# Patient Record
Sex: FEMALE | Race: WHITE | NOT HISPANIC OR LATINO | Employment: FULL TIME | URBAN - METROPOLITAN AREA
[De-identification: names, ages, dates, MRNs, and addresses within clinical notes are randomized per-mention and may not be internally consistent; named-entity substitution may affect disease eponyms.]

---

## 2023-02-24 LAB
ALANINE AMINOTRANSFERASE (SGPT) (U/L) IN SER/PLAS: 13 U/L (ref 7–45)
ALBUMIN (G/DL) IN SER/PLAS: 3.8 G/DL (ref 3.4–5)
ALKALINE PHOSPHATASE (U/L) IN SER/PLAS: 67 U/L (ref 33–110)
ANION GAP IN SER/PLAS: 14 MMOL/L (ref 10–20)
ASPARTATE AMINOTRANSFERASE (SGOT) (U/L) IN SER/PLAS: 10 U/L (ref 9–39)
BILIRUBIN TOTAL (MG/DL) IN SER/PLAS: 0.4 MG/DL (ref 0–1.2)
CALCIUM (MG/DL) IN SER/PLAS: 8.8 MG/DL (ref 8.6–10.3)
CARBON DIOXIDE, TOTAL (MMOL/L) IN SER/PLAS: 25 MMOL/L (ref 21–32)
CHLORIDE (MMOL/L) IN SER/PLAS: 105 MMOL/L (ref 98–107)
CHOLESTEROL (MG/DL) IN SER/PLAS: 208 MG/DL (ref 0–199)
CHOLESTEROL IN HDL (MG/DL) IN SER/PLAS: 71.8 MG/DL
CHOLESTEROL/HDL RATIO: 2.9
CREATININE (MG/DL) IN SER/PLAS: 0.79 MG/DL (ref 0.5–1.05)
GFR FEMALE: >90 ML/MIN/1.73M2
GLUCOSE (MG/DL) IN SER/PLAS: 101 MG/DL (ref 74–99)
LDL: 104 MG/DL (ref 0–99)
POTASSIUM (MMOL/L) IN SER/PLAS: 3.9 MMOL/L (ref 3.5–5.3)
PROTEIN TOTAL: 6.7 G/DL (ref 6.4–8.2)
SODIUM (MMOL/L) IN SER/PLAS: 140 MMOL/L (ref 136–145)
TRIGLYCERIDE (MG/DL) IN SER/PLAS: 161 MG/DL (ref 0–149)
UREA NITROGEN (MG/DL) IN SER/PLAS: 13 MG/DL (ref 6–23)
VLDL: 32 MG/DL (ref 0–40)

## 2023-04-27 DIAGNOSIS — I10 HYPERTENSION, UNSPECIFIED TYPE: ICD-10-CM

## 2023-04-27 RX ORDER — METOPROLOL SUCCINATE 100 MG/1
100 TABLET, EXTENDED RELEASE ORAL DAILY
COMMUNITY
End: 2023-04-27 | Stop reason: SDUPTHER

## 2023-04-27 RX ORDER — METOPROLOL SUCCINATE 100 MG/1
100 TABLET, EXTENDED RELEASE ORAL DAILY
Qty: 30 TABLET | Refills: 0 | Status: SHIPPED | OUTPATIENT
Start: 2023-04-27 | End: 2023-05-31 | Stop reason: SDUPTHER

## 2023-05-31 ENCOUNTER — OFFICE VISIT (OUTPATIENT)
Dept: PRIMARY CARE | Facility: CLINIC | Age: 43
End: 2023-05-31
Payer: COMMERCIAL

## 2023-05-31 ENCOUNTER — LAB (OUTPATIENT)
Dept: LAB | Facility: LAB | Age: 43
End: 2023-05-31
Payer: COMMERCIAL

## 2023-05-31 VITALS
HEART RATE: 62 BPM | DIASTOLIC BLOOD PRESSURE: 80 MMHG | HEIGHT: 67 IN | SYSTOLIC BLOOD PRESSURE: 128 MMHG | WEIGHT: 286.8 LBS | BODY MASS INDEX: 45.01 KG/M2

## 2023-05-31 DIAGNOSIS — E28.2 POLYCYSTIC OVARIAN SYNDROME: ICD-10-CM

## 2023-05-31 DIAGNOSIS — Z12.31 ENCOUNTER FOR SCREENING MAMMOGRAM FOR MALIGNANT NEOPLASM OF BREAST: ICD-10-CM

## 2023-05-31 DIAGNOSIS — I10 HYPERTENSION, UNSPECIFIED TYPE: ICD-10-CM

## 2023-05-31 DIAGNOSIS — E78.5 DYSLIPIDEMIA: ICD-10-CM

## 2023-05-31 DIAGNOSIS — I10 PRIMARY HYPERTENSION: ICD-10-CM

## 2023-05-31 DIAGNOSIS — E88.810 METABOLIC SYNDROME: ICD-10-CM

## 2023-05-31 DIAGNOSIS — Z00.00 HEALTHCARE MAINTENANCE: ICD-10-CM

## 2023-05-31 DIAGNOSIS — Z00.00 HEALTHCARE MAINTENANCE: Primary | ICD-10-CM

## 2023-05-31 PROBLEM — G89.29 CHRONIC LEFT-SIDED LOW BACK PAIN WITH LEFT-SIDED SCIATICA: Status: ACTIVE | Noted: 2023-05-31

## 2023-05-31 PROBLEM — D72.9 NEUTROPHILIC LEUKOCYTOSIS: Status: ACTIVE | Noted: 2023-05-31

## 2023-05-31 PROBLEM — E66.9 OBESITY: Status: ACTIVE | Noted: 2023-05-31

## 2023-05-31 PROBLEM — D72.828 NEUTROPHILIC LEUKOCYTOSIS: Status: ACTIVE | Noted: 2023-05-31

## 2023-05-31 PROBLEM — M54.42 CHRONIC LEFT-SIDED LOW BACK PAIN WITH LEFT-SIDED SCIATICA: Status: ACTIVE | Noted: 2023-05-31

## 2023-05-31 LAB
ALANINE AMINOTRANSFERASE (SGPT) (U/L) IN SER/PLAS: 11 U/L (ref 7–45)
ALBUMIN (G/DL) IN SER/PLAS: 4.1 G/DL (ref 3.4–5)
ALKALINE PHOSPHATASE (U/L) IN SER/PLAS: 78 U/L (ref 33–110)
ANION GAP IN SER/PLAS: 15 MMOL/L (ref 10–20)
ASPARTATE AMINOTRANSFERASE (SGOT) (U/L) IN SER/PLAS: 12 U/L (ref 9–39)
BASOPHILS (10*3/UL) IN BLOOD BY AUTOMATED COUNT: 0.05 X10E9/L (ref 0–0.1)
BASOPHILS/100 LEUKOCYTES IN BLOOD BY AUTOMATED COUNT: 0.3 % (ref 0–2)
BILIRUBIN TOTAL (MG/DL) IN SER/PLAS: 0.3 MG/DL (ref 0–1.2)
CALCIUM (MG/DL) IN SER/PLAS: 9.5 MG/DL (ref 8.6–10.3)
CARBON DIOXIDE, TOTAL (MMOL/L) IN SER/PLAS: 25 MMOL/L (ref 21–32)
CHLORIDE (MMOL/L) IN SER/PLAS: 103 MMOL/L (ref 98–107)
CHOLESTEROL (MG/DL) IN SER/PLAS: 179 MG/DL (ref 0–199)
CHOLESTEROL IN HDL (MG/DL) IN SER/PLAS: 89.1 MG/DL
CHOLESTEROL/HDL RATIO: 2
CREATININE (MG/DL) IN SER/PLAS: 0.75 MG/DL (ref 0.5–1.05)
EOSINOPHILS (10*3/UL) IN BLOOD BY AUTOMATED COUNT: 0.19 X10E9/L (ref 0–0.7)
EOSINOPHILS/100 LEUKOCYTES IN BLOOD BY AUTOMATED COUNT: 1.3 % (ref 0–6)
ERYTHROCYTE DISTRIBUTION WIDTH (RATIO) BY AUTOMATED COUNT: 12.4 % (ref 11.5–14.5)
ERYTHROCYTE MEAN CORPUSCULAR HEMOGLOBIN CONCENTRATION (G/DL) BY AUTOMATED: 32.3 G/DL (ref 32–36)
ERYTHROCYTE MEAN CORPUSCULAR VOLUME (FL) BY AUTOMATED COUNT: 91 FL (ref 80–100)
ERYTHROCYTES (10*6/UL) IN BLOOD BY AUTOMATED COUNT: 4.85 X10E12/L (ref 4–5.2)
ESTIMATED AVERAGE GLUCOSE FOR HBA1C: 126 MG/DL
GFR FEMALE: >90 ML/MIN/1.73M2
GLUCOSE (MG/DL) IN SER/PLAS: 94 MG/DL (ref 74–99)
HEMATOCRIT (%) IN BLOOD BY AUTOMATED COUNT: 44.3 % (ref 36–46)
HEMOGLOBIN (G/DL) IN BLOOD: 14.3 G/DL (ref 12–16)
HEMOGLOBIN A1C/HEMOGLOBIN TOTAL IN BLOOD: 6 %
IMMATURE GRANULOCYTES/100 LEUKOCYTES IN BLOOD BY AUTOMATED COUNT: 0.5 % (ref 0–0.9)
LDL: 63 MG/DL (ref 0–99)
LEUKOCYTES (10*3/UL) IN BLOOD BY AUTOMATED COUNT: 14.8 X10E9/L (ref 4.4–11.3)
LYMPHOCYTES (10*3/UL) IN BLOOD BY AUTOMATED COUNT: 2.41 X10E9/L (ref 1.2–4.8)
LYMPHOCYTES/100 LEUKOCYTES IN BLOOD BY AUTOMATED COUNT: 16.3 % (ref 13–44)
MONOCYTES (10*3/UL) IN BLOOD BY AUTOMATED COUNT: 0.74 X10E9/L (ref 0.1–1)
MONOCYTES/100 LEUKOCYTES IN BLOOD BY AUTOMATED COUNT: 5 % (ref 2–10)
NEUTROPHILS (10*3/UL) IN BLOOD BY AUTOMATED COUNT: 11.32 X10E9/L (ref 1.2–7.7)
NEUTROPHILS/100 LEUKOCYTES IN BLOOD BY AUTOMATED COUNT: 76.6 % (ref 40–80)
PLATELETS (10*3/UL) IN BLOOD AUTOMATED COUNT: 370 X10E9/L (ref 150–450)
POC APPEARANCE, URINE: CLEAR
POC BILIRUBIN, URINE: NEGATIVE
POC BLOOD, URINE: NEGATIVE
POC COLOR, URINE: NORMAL
POC GLUCOSE, URINE: NEGATIVE MG/DL
POC KETONES, URINE: NEGATIVE MG/DL
POC LEUKOCYTES, URINE: NEGATIVE
POC NITRITE,URINE: NEGATIVE
POC PH, URINE: 6 PH
POC PROTEIN, URINE: NEGATIVE MG/DL
POC SPECIFIC GRAVITY, URINE: 1.02
POC UROBILINOGEN, URINE: 0.2 EU/DL
POTASSIUM (MMOL/L) IN SER/PLAS: 4.3 MMOL/L (ref 3.5–5.3)
PROTEIN TOTAL: 7.5 G/DL (ref 6.4–8.2)
SODIUM (MMOL/L) IN SER/PLAS: 139 MMOL/L (ref 136–145)
THYROTROPIN (MIU/L) IN SER/PLAS BY DETECTION LIMIT <= 0.05 MIU/L: 1.64 MIU/L (ref 0.44–3.98)
TRIGLYCERIDE (MG/DL) IN SER/PLAS: 136 MG/DL (ref 0–149)
UREA NITROGEN (MG/DL) IN SER/PLAS: 12 MG/DL (ref 6–23)
VLDL: 27 MG/DL (ref 0–40)

## 2023-05-31 PROCEDURE — 80061 LIPID PANEL: CPT

## 2023-05-31 PROCEDURE — 99213 OFFICE O/P EST LOW 20 MIN: CPT | Performed by: STUDENT IN AN ORGANIZED HEALTH CARE EDUCATION/TRAINING PROGRAM

## 2023-05-31 PROCEDURE — 3074F SYST BP LT 130 MM HG: CPT | Performed by: STUDENT IN AN ORGANIZED HEALTH CARE EDUCATION/TRAINING PROGRAM

## 2023-05-31 PROCEDURE — 99396 PREV VISIT EST AGE 40-64: CPT | Performed by: STUDENT IN AN ORGANIZED HEALTH CARE EDUCATION/TRAINING PROGRAM

## 2023-05-31 PROCEDURE — 80053 COMPREHEN METABOLIC PANEL: CPT

## 2023-05-31 PROCEDURE — 84443 ASSAY THYROID STIM HORMONE: CPT

## 2023-05-31 PROCEDURE — 93000 ELECTROCARDIOGRAM COMPLETE: CPT | Performed by: STUDENT IN AN ORGANIZED HEALTH CARE EDUCATION/TRAINING PROGRAM

## 2023-05-31 PROCEDURE — 83036 HEMOGLOBIN GLYCOSYLATED A1C: CPT

## 2023-05-31 PROCEDURE — 3079F DIAST BP 80-89 MM HG: CPT | Performed by: STUDENT IN AN ORGANIZED HEALTH CARE EDUCATION/TRAINING PROGRAM

## 2023-05-31 PROCEDURE — 85025 COMPLETE CBC W/AUTO DIFF WBC: CPT

## 2023-05-31 PROCEDURE — 36415 COLL VENOUS BLD VENIPUNCTURE: CPT

## 2023-05-31 PROCEDURE — 1036F TOBACCO NON-USER: CPT | Performed by: STUDENT IN AN ORGANIZED HEALTH CARE EDUCATION/TRAINING PROGRAM

## 2023-05-31 PROCEDURE — 81002 URINALYSIS NONAUTO W/O SCOPE: CPT | Performed by: STUDENT IN AN ORGANIZED HEALTH CARE EDUCATION/TRAINING PROGRAM

## 2023-05-31 RX ORDER — ROSUVASTATIN CALCIUM 20 MG/1
20 TABLET, COATED ORAL DAILY
Qty: 90 TABLET | Refills: 1 | Status: SHIPPED | OUTPATIENT
Start: 2023-05-31 | End: 2024-01-17 | Stop reason: SDUPTHER

## 2023-05-31 RX ORDER — NORGESTIMATE AND ETHINYL ESTRADIOL 0.25-0.035
1 KIT ORAL DAILY
COMMUNITY
End: 2023-12-08 | Stop reason: SDUPTHER

## 2023-05-31 RX ORDER — OLMESARTAN MEDOXOMIL 20 MG/1
20 TABLET ORAL DAILY
Qty: 90 TABLET | Refills: 1 | Status: SHIPPED | OUTPATIENT
Start: 2023-05-31 | End: 2024-01-17 | Stop reason: SDUPTHER

## 2023-05-31 RX ORDER — SPIRONOLACTONE 100 MG/1
100 TABLET, FILM COATED ORAL 2 TIMES DAILY
COMMUNITY
End: 2023-12-08 | Stop reason: SDUPTHER

## 2023-05-31 RX ORDER — METOPROLOL SUCCINATE 100 MG/1
100 TABLET, EXTENDED RELEASE ORAL DAILY
Qty: 90 TABLET | Refills: 1 | Status: SHIPPED | OUTPATIENT
Start: 2023-05-31 | End: 2024-01-17 | Stop reason: SDUPTHER

## 2023-05-31 RX ORDER — METFORMIN HYDROCHLORIDE 500 MG/1
1000 TABLET, EXTENDED RELEASE ORAL 2 TIMES DAILY
COMMUNITY
End: 2023-12-08 | Stop reason: SDUPTHER

## 2023-05-31 RX ORDER — OLMESARTAN MEDOXOMIL 20 MG/1
20 TABLET ORAL DAILY
COMMUNITY
End: 2023-05-31 | Stop reason: SDUPTHER

## 2023-05-31 RX ORDER — ROSUVASTATIN CALCIUM 20 MG/1
20 TABLET, COATED ORAL DAILY
COMMUNITY
End: 2023-05-31 | Stop reason: SDUPTHER

## 2023-05-31 NOTE — RESULT ENCOUNTER NOTE
Hemoglobin A1c shows stability and some slight improvement at 6.0%    Complete blood cell count continues to show slightly elevated white blood cell count at 14.8 but otherwise within normal limits with a continued slightly elevated neutrophil count    I know the patient did see hematology in the past, but it might be wise to discuss having her follow-up once again within the next few years    Total cholesterol noted much improvement to 179, HDL 89, LDL 63, triglycerides 136    Sugar, kidneys, liver, electrolytes are all within normal limits    Thyroid within normal limits

## 2023-05-31 NOTE — PROGRESS NOTES
Subjective   Patient ID: Diamond Mitchell is a 43 y.o. female who presents for Annual Exam.  Today she is accompanied by alone.     HPI  1. Health Maintenance   Immunization: UTD  OB/GYN: last pap smear 2015, mammogram 5/2022, willing to update  Colon Cancer Screening: no family history  Diet/Exercise: Attempts balanced diet and exercise  Tobacco: denies use  EtOH: rarely     2. Hypertension  Taking metoprolol 100 mg daily and olmesartan 20 mg daily as prescribed   Tolerating well without side effects   Denies cardiopulmonary symptoms   Requesting refill today     3. Hyperlipidemia:   Lipid panel 208 71 104 161 as of 2/24/2023  Currently on rosuvastatin 20 mg as prescribed.   Requesting refill.    4. PCOS  Continues to follow up with Endocrinologist  Denies any concerns or complaints.    Current Outpatient Medications on File Prior to Visit   Medication Sig Dispense Refill    metFORMIN XR (Glucophage-XR) 500 mg 24 hr tablet Take 2 tablets (1,000 mg) by mouth 2 times a day.      metoprolol succinate XL (Toprol-XL) 100 mg 24 hr tablet Take 1 tablet (100 mg) by mouth once daily. 30 tablet 0    olmesartan (BENIcar) 20 mg tablet Take 1 tablet (20 mg) by mouth once daily.      rosuvastatin (Crestor) 20 mg tablet Take 1 tablet (20 mg) by mouth once daily.      spironolactone (Aldactone) 100 mg tablet Take 1 tablet (100 mg) by mouth 2 times a day.      Sprintec, 28, 0.25-35 mg-mcg tablet Take 1 tablet by mouth once daily.       No current facility-administered medications on file prior to visit.        Allergies   Allergen Reactions    Lisinopril Other       Immunization History   Administered Date(s) Administered    Moderna SARS-CoV-2 Vaccination 01/09/2021, 02/03/2021    Tdap 05/03/2021         Review of Systems  All pertinent positive symptoms are included in the history of present illness.  All other systems have been reviewed and are negative and noncontributory to this patient's current ailments.     Objective   BP  "128/80 (BP Location: Left arm, Patient Position: Sitting, BP Cuff Size: Large adult)   Pulse 62   Ht 1.689 m (5' 6.5\")   Wt 130 kg (286 lb 12.8 oz)   BMI 45.60 kg/m²   BSA: 2.47 meters squared  No visits with results within 1 Month(s) from this visit.   Latest known visit with results is:   Orders Only on 02/24/2023   Component Date Value Ref Range Status    Cholesterol 02/24/2023 208 (H)  0 - 199 mg/dL Final    Comment: .      AGE      DESIRABLE   BORDERLINE HIGH   HIGH     0-19 Y     0 - 169       170 - 199     >/= 200    20-24 Y     0 - 189       190 - 224     >/= 225         >24 Y     0 - 199       200 - 239     >/= 240   **All ranges are based on fasting samples. Specific   therapeutic targets will vary based on patient-specific   cardiac risk.  .   Pediatric guidelines reference:Pediatrics 2011, 128(S5).   Adult guidelines reference: NCEP ATPIII Guidelines,     LOR 2001, 258:2486-97  .   Venipuncture immediately after or during the    administration of Metamizole may lead to falsely   low results. Testing should be performed immediately   prior to Metamizole dosing.      HDL 02/24/2023 71.8  mg/dL Final    Comment: .      AGE      VERY LOW   LOW     NORMAL    HIGH       0-19 Y       < 35   < 40     40-45     ----    20-24 Y       ----   < 40       >45     ----      >24 Y       ----   < 40     40-60      >60  .      Cholesterol/HDL Ratio 02/24/2023 2.9   Final    Comment: REF VALUES  DESIRABLE  < 3.4  HIGH RISK  > 5.0      LDL 02/24/2023 104 (H)  0 - 99 mg/dL Final    Comment: .                           NEAR      BORD      AGE      DESIRABLE  OPTIMAL    HIGH     HIGH     VERY HIGH     0-19 Y     0 - 109     ---    110-129   >/= 130     ----    20-24 Y     0 - 119     ---    120-159   >/= 160     ----      >24 Y     0 -  99   100-129  130-159   160-189     >/=190  .      VLDL 02/24/2023 32  0 - 40 mg/dL Final    Triglycerides 02/24/2023 161 (H)  0 - 149 mg/dL Final    Comment: .      AGE      DESIRABLE   " BORDERLINE HIGH   HIGH     VERY HIGH   0 D-90 D    19 - 174         ----         ----        ----  91 D- 9 Y     0 -  74        75 -  99     >/= 100      ----    10-19 Y     0 -  89        90 - 129     >/= 130      ----    20-24 Y     0 - 114       115 - 149     >/= 150      ----         >24 Y     0 - 149       150 - 199    200- 499    >/= 500  .   Venipuncture immediately after or during the    administration of Metamizole may lead to falsely   low results. Testing should be performed immediately   prior to Metamizole dosing.         Physical Exam  CONSTITUTIONAL - well nourished, well developed, looks like stated age, in no acute distress, not ill-appearing, and not tired appearing  SKIN - normal skin color and pigmentation, normal skin turgor without rash, lesions, or nodules visualized  HEAD - no trauma, normocephalic  EYES - normal external exam  NECK - supple without rigidity, no neck mass was observed, no thyromegaly or thyroid nodules  CHEST - clear to auscultation, no wheezing, no crackles and no rales, good effort  CARDIAC - regular rate and regular rhythm, no skipped beats, no murmur  EXTREMITIES - no edema, no deformities  NEUROLOGICAL - normal gait, normal balance, normal motor, no ataxia  PSYCHIATRIC - alert, pleasant and cordial, age-appropriate  IMMUNOLOGIC - no cervical lymphadenopathy     Assessment/Plan   1. Health Maintenance   Complete history and physical examination was performed  EKG reveals sinus bradycardia without acute changes  UA done in office  We will notify of test results once available and make treatment recommendations accordingly  You are due for pap testing, and information for a new OB/GYN was provided today     2. Breast cancer screening   Order for screening mammogram provided today. Please have this done at your convenience.      3. Hypertension  Stable, please continue current medications as prescribed.   Refills for olmesartan 20 mg and metoprolol 100 mg daily were sent to  your pharmacy today.   I would like to have you monitor and record blood pressures at home   Blood pressure goal should be below 130/80, ideally 120/80      4. Hyperlipidemia:   Lipid panel was ordered with today's blood work.   We will notify you of test results once available and make treatment recommendations accordingly.   In the meantime, please continue to take rosuvastatin 20 mg as prescribed.     5. PCOS  Please continue to follow up with your endocrinologist.    Please follow up as needed and/or with any new concerns.

## 2023-09-29 ENCOUNTER — TELEPHONE (OUTPATIENT)
Dept: PRIMARY CARE | Facility: CLINIC | Age: 43
End: 2023-09-29

## 2023-09-29 NOTE — TELEPHONE ENCOUNTER
Spoke w/pt, pt aware of results      Mammogram shows a BI-RADS Category 2, benign     Recommended yearly mammograms     There was a small asymmetry within the right breast but this is similar to the prior examination in 2022 so we will continue monitoring yearly     No suspicious masses noted

## 2023-10-22 PROBLEM — M25.552 BILATERAL HIP PAIN: Status: ACTIVE | Noted: 2023-10-22

## 2023-10-22 PROBLEM — R73.03 PREDIABETES: Status: ACTIVE | Noted: 2023-10-22

## 2023-10-22 PROBLEM — M25.551 BILATERAL HIP PAIN: Status: ACTIVE | Noted: 2023-10-22

## 2023-10-24 ENCOUNTER — APPOINTMENT (OUTPATIENT)
Dept: AUDIOLOGY | Facility: CLINIC | Age: 43
End: 2023-10-24

## 2023-12-08 ENCOUNTER — OFFICE VISIT (OUTPATIENT)
Dept: ENDOCRINOLOGY | Facility: CLINIC | Age: 43
End: 2023-12-08
Payer: COMMERCIAL

## 2023-12-08 VITALS
RESPIRATION RATE: 16 BRPM | SYSTOLIC BLOOD PRESSURE: 124 MMHG | BODY MASS INDEX: 47.48 KG/M2 | HEART RATE: 73 BPM | HEIGHT: 65 IN | DIASTOLIC BLOOD PRESSURE: 70 MMHG | WEIGHT: 285 LBS

## 2023-12-08 DIAGNOSIS — E28.2 POLYCYSTIC OVARIAN SYNDROME: ICD-10-CM

## 2023-12-08 DIAGNOSIS — I10 PRIMARY HYPERTENSION: ICD-10-CM

## 2023-12-08 DIAGNOSIS — E78.5 DYSLIPIDEMIA: ICD-10-CM

## 2023-12-08 DIAGNOSIS — R73.03 PRE-DIABETES: Primary | ICD-10-CM

## 2023-12-08 PROCEDURE — 1036F TOBACCO NON-USER: CPT | Performed by: INTERNAL MEDICINE

## 2023-12-08 PROCEDURE — 99214 OFFICE O/P EST MOD 30 MIN: CPT | Performed by: INTERNAL MEDICINE

## 2023-12-08 PROCEDURE — 3074F SYST BP LT 130 MM HG: CPT | Performed by: INTERNAL MEDICINE

## 2023-12-08 PROCEDURE — 3078F DIAST BP <80 MM HG: CPT | Performed by: INTERNAL MEDICINE

## 2023-12-08 RX ORDER — METFORMIN HYDROCHLORIDE 500 MG/1
1000 TABLET, EXTENDED RELEASE ORAL 2 TIMES DAILY
Qty: 360 TABLET | Refills: 3 | Status: SHIPPED | OUTPATIENT
Start: 2023-12-08 | End: 2024-12-07

## 2023-12-08 RX ORDER — SPIRONOLACTONE 100 MG/1
100 TABLET, FILM COATED ORAL 2 TIMES DAILY
Qty: 180 TABLET | Refills: 3 | Status: SHIPPED | OUTPATIENT
Start: 2023-12-08 | End: 2024-12-07

## 2023-12-08 RX ORDER — NORGESTIMATE AND ETHINYL ESTRADIOL 0.25-0.035
1 KIT ORAL DAILY
Qty: 84 TABLET | Refills: 3 | Status: SHIPPED | OUTPATIENT
Start: 2023-12-08 | End: 2024-12-07

## 2023-12-08 ASSESSMENT — ENCOUNTER SYMPTOMS
CHILLS: 0
VOMITING: 0
DIARRHEA: 0
SHORTNESS OF BREATH: 0
NAUSEA: 0
HEADACHES: 0
PALPITATIONS: 0
COUGH: 0
FEVER: 0
FATIGUE: 0

## 2023-12-08 NOTE — PROGRESS NOTES
Endocrinology: Follow up visit  Subjective   Patient ID: Diamond Mitchell is a 43 y.o. female who presents for Polycystic Ovary Syndrome, Prediabetes, Hyperlipidemia, and Hypertension.    PCP: Samson Vaca, DO WANG  Last seen 6 months ago.   Advanced to 3 metformin a day and doing ok on it.  Feeling fine.  Still busy with work. Tried to follow low carb and get in some activity    Review of Systems   Constitutional:  Negative for chills, fatigue and fever.   Respiratory:  Negative for cough and shortness of breath.    Cardiovascular:  Negative for chest pain and palpitations.   Gastrointestinal:  Negative for diarrhea, nausea and vomiting.   Neurological:  Negative for headaches.       Patient Active Problem List   Diagnosis    Dyslipidemia    Hypertension    Chronic left-sided low back pain with left-sided sciatica    Metabolic syndrome    Neutrophilic leukocytosis    Obesity    Polycystic ovarian syndrome    Bilateral hip pain    Prediabetes        Home Meds:  Current Outpatient Medications   Medication Instructions    metFORMIN XR (GLUCOPHAGE-XR) 1,000 mg, oral, 2 times daily    metoprolol succinate XL (TOPROL-XL) 100 mg, oral, Daily    olmesartan (BENICAR) 20 mg, oral, Daily    rosuvastatin (CRESTOR) 20 mg, oral, Daily    spironolactone (ALDACTONE) 100 mg, oral, 2 times daily    Sprintec, 28, 0.25-35 mg-mcg tablet 1 tablet, oral, Daily        Allergies   Allergen Reactions    Lisinopril Other        Objective   Vitals:    12/08/23 1042   BP: 124/70   Pulse: 73   Resp: 16      Vitals:    12/08/23 1042   Weight: 129 kg (285 lb)      Body mass index is 47.43 kg/m².   Physical Exam  Constitutional:       Appearance: Normal appearance. She is overweight.   HENT:      Head: Normocephalic and atraumatic.   Neck:      Thyroid: No thyroid mass, thyromegaly or thyroid tenderness.   Cardiovascular:      Rate and Rhythm: Normal rate and regular rhythm.      Heart sounds: No murmur heard.     No gallop.   Pulmonary:  "     Effort: Pulmonary effort is normal.      Breath sounds: Normal breath sounds.   Abdominal:      Palpations: Abdomen is soft.      Comments: benign   Neurological:      General: No focal deficit present.      Mental Status: She is alert and oriented to person, place, and time.      Deep Tendon Reflexes: Reflexes are normal and symmetric.   Psychiatric:         Behavior: Behavior is cooperative.         Labs:  Lab Results   Component Value Date    HGBA1C 6.0 (A) 05/31/2023    TSH 1.64 05/31/2023      No results found for: \"PR1\", \"THYROIDPAB\", \"TSI\"     Assessment/Plan   Problem List Items Addressed This Visit       Dyslipidemia    Hypertension    Polycystic ovarian syndrome     Other Visit Diagnoses       Pre-diabetes    -  Primary    Relevant Orders    Comprehensive Metabolic Panel    Hemoglobin A1C    Lipid Panel        Predm:  Labs today  Encouraged continued efforts with weight, continue increased metformin dosing  Htn:  Bp excellent  Lipids:  Recheck today  PCOS:  Stable on spironolactone + ocps  Follow up in one year    Electronically signed by:  Nadine Rees MD 12/08/23 11:04 AM             "

## 2024-01-17 ENCOUNTER — LAB (OUTPATIENT)
Dept: LAB | Facility: LAB | Age: 44
End: 2024-01-17
Payer: COMMERCIAL

## 2024-01-17 DIAGNOSIS — E78.5 DYSLIPIDEMIA: ICD-10-CM

## 2024-01-17 DIAGNOSIS — R73.03 PRE-DIABETES: ICD-10-CM

## 2024-01-17 DIAGNOSIS — I10 PRIMARY HYPERTENSION: ICD-10-CM

## 2024-01-17 LAB
ALBUMIN SERPL BCP-MCNC: 3.9 G/DL (ref 3.4–5)
ALP SERPL-CCNC: 81 U/L (ref 33–110)
ALT SERPL W P-5'-P-CCNC: 12 U/L (ref 7–45)
ANION GAP SERPL CALC-SCNC: 14 MMOL/L (ref 10–20)
AST SERPL W P-5'-P-CCNC: 11 U/L (ref 9–39)
BILIRUB SERPL-MCNC: 0.5 MG/DL (ref 0–1.2)
BUN SERPL-MCNC: 15 MG/DL (ref 6–23)
CALCIUM SERPL-MCNC: 9.4 MG/DL (ref 8.6–10.3)
CHLORIDE SERPL-SCNC: 103 MMOL/L (ref 98–107)
CHOLEST SERPL-MCNC: 186 MG/DL (ref 0–199)
CHOLESTEROL/HDL RATIO: 2.4
CO2 SERPL-SCNC: 26 MMOL/L (ref 21–32)
CREAT SERPL-MCNC: 0.68 MG/DL (ref 0.5–1.05)
EGFRCR SERPLBLD CKD-EPI 2021: >90 ML/MIN/1.73M*2
EST. AVERAGE GLUCOSE BLD GHB EST-MCNC: 128 MG/DL
GLUCOSE SERPL-MCNC: 110 MG/DL (ref 74–99)
HBA1C MFR BLD: 6.1 %
HDLC SERPL-MCNC: 76.6 MG/DL
LDLC SERPL CALC-MCNC: 78 MG/DL
NON HDL CHOLESTEROL: 109 MG/DL (ref 0–149)
POTASSIUM SERPL-SCNC: 5 MMOL/L (ref 3.5–5.3)
PROT SERPL-MCNC: 6.4 G/DL (ref 6.4–8.2)
SODIUM SERPL-SCNC: 138 MMOL/L (ref 136–145)
TRIGL SERPL-MCNC: 157 MG/DL (ref 0–149)
VLDL: 31 MG/DL (ref 0–40)

## 2024-01-17 PROCEDURE — 80053 COMPREHEN METABOLIC PANEL: CPT

## 2024-01-17 PROCEDURE — 83036 HEMOGLOBIN GLYCOSYLATED A1C: CPT

## 2024-01-17 PROCEDURE — 80061 LIPID PANEL: CPT

## 2024-01-17 PROCEDURE — 36415 COLL VENOUS BLD VENIPUNCTURE: CPT

## 2024-01-17 RX ORDER — METOPROLOL SUCCINATE 100 MG/1
100 TABLET, EXTENDED RELEASE ORAL DAILY
Qty: 90 TABLET | Refills: 0 | Status: SHIPPED | OUTPATIENT
Start: 2024-01-17 | End: 2024-05-07

## 2024-01-17 RX ORDER — OLMESARTAN MEDOXOMIL 20 MG/1
20 TABLET ORAL DAILY
Qty: 90 TABLET | Refills: 0 | Status: SHIPPED | OUTPATIENT
Start: 2024-01-17 | End: 2024-06-07 | Stop reason: SDUPTHER

## 2024-01-17 RX ORDER — ROSUVASTATIN CALCIUM 20 MG/1
20 TABLET, COATED ORAL DAILY
Qty: 90 TABLET | Refills: 0 | Status: SHIPPED | OUTPATIENT
Start: 2024-01-17 | End: 2024-05-10 | Stop reason: SDUPTHER

## 2024-05-02 ENCOUNTER — APPOINTMENT (OUTPATIENT)
Dept: OTOLARYNGOLOGY | Facility: CLINIC | Age: 44
End: 2024-05-02

## 2024-05-07 DIAGNOSIS — I10 PRIMARY HYPERTENSION: ICD-10-CM

## 2024-05-07 RX ORDER — METOPROLOL SUCCINATE 100 MG/1
100 TABLET, EXTENDED RELEASE ORAL DAILY
Qty: 30 TABLET | Refills: 0 | Status: SHIPPED | OUTPATIENT
Start: 2024-05-07 | End: 2024-05-10 | Stop reason: SDUPTHER

## 2024-05-10 DIAGNOSIS — I10 PRIMARY HYPERTENSION: ICD-10-CM

## 2024-05-10 DIAGNOSIS — E78.5 DYSLIPIDEMIA: ICD-10-CM

## 2024-05-10 RX ORDER — ROSUVASTATIN CALCIUM 20 MG/1
20 TABLET, COATED ORAL DAILY
Qty: 30 TABLET | Refills: 0 | Status: SHIPPED | OUTPATIENT
Start: 2024-05-10 | End: 2024-06-07 | Stop reason: SDUPTHER

## 2024-05-10 RX ORDER — METOPROLOL SUCCINATE 100 MG/1
100 TABLET, EXTENDED RELEASE ORAL DAILY
Qty: 30 TABLET | Refills: 0 | Status: SHIPPED | OUTPATIENT
Start: 2024-05-10 | End: 2024-06-07 | Stop reason: SDUPTHER

## 2024-05-18 ENCOUNTER — APPOINTMENT (OUTPATIENT)
Dept: OTOLARYNGOLOGY | Facility: CLINIC | Age: 44
End: 2024-05-18

## 2024-06-07 ENCOUNTER — LAB (OUTPATIENT)
Dept: LAB | Facility: LAB | Age: 44
End: 2024-06-07
Payer: COMMERCIAL

## 2024-06-07 ENCOUNTER — OFFICE VISIT (OUTPATIENT)
Dept: PRIMARY CARE | Facility: CLINIC | Age: 44
End: 2024-06-07
Payer: COMMERCIAL

## 2024-06-07 VITALS
SYSTOLIC BLOOD PRESSURE: 106 MMHG | WEIGHT: 283 LBS | BODY MASS INDEX: 47.09 KG/M2 | DIASTOLIC BLOOD PRESSURE: 70 MMHG | OXYGEN SATURATION: 97 % | HEART RATE: 80 BPM

## 2024-06-07 DIAGNOSIS — Z12.11 COLON CANCER SCREENING: ICD-10-CM

## 2024-06-07 DIAGNOSIS — I10 PRIMARY HYPERTENSION: ICD-10-CM

## 2024-06-07 DIAGNOSIS — E28.2 POLYCYSTIC OVARIAN SYNDROME: ICD-10-CM

## 2024-06-07 DIAGNOSIS — E78.5 DYSLIPIDEMIA: ICD-10-CM

## 2024-06-07 DIAGNOSIS — Z12.31 ENCOUNTER FOR SCREENING MAMMOGRAM FOR MALIGNANT NEOPLASM OF BREAST: ICD-10-CM

## 2024-06-07 DIAGNOSIS — Z00.00 HEALTHCARE MAINTENANCE: Primary | ICD-10-CM

## 2024-06-07 DIAGNOSIS — E88.810 METABOLIC SYNDROME: ICD-10-CM

## 2024-06-07 DIAGNOSIS — Z00.00 HEALTHCARE MAINTENANCE: ICD-10-CM

## 2024-06-07 LAB
ALBUMIN SERPL BCP-MCNC: 4.1 G/DL (ref 3.4–5)
ALP SERPL-CCNC: 78 U/L (ref 33–110)
ALT SERPL W P-5'-P-CCNC: 12 U/L (ref 7–45)
ANION GAP SERPL CALC-SCNC: 17 MMOL/L (ref 10–20)
AST SERPL W P-5'-P-CCNC: 16 U/L (ref 9–39)
BASOPHILS # BLD AUTO: 0.06 X10*3/UL (ref 0–0.1)
BASOPHILS NFR BLD AUTO: 0.4 %
BILIRUB SERPL-MCNC: 0.5 MG/DL (ref 0–1.2)
BUN SERPL-MCNC: 10 MG/DL (ref 6–23)
CALCIUM SERPL-MCNC: 9.2 MG/DL (ref 8.6–10.3)
CHLORIDE SERPL-SCNC: 103 MMOL/L (ref 98–107)
CHOLEST SERPL-MCNC: 167 MG/DL (ref 0–199)
CHOLESTEROL/HDL RATIO: 1.9
CO2 SERPL-SCNC: 21 MMOL/L (ref 21–32)
CREAT SERPL-MCNC: 0.65 MG/DL (ref 0.5–1.05)
EGFRCR SERPLBLD CKD-EPI 2021: >90 ML/MIN/1.73M*2
EOSINOPHIL # BLD AUTO: 0.26 X10*3/UL (ref 0–0.7)
EOSINOPHIL NFR BLD AUTO: 1.8 %
ERYTHROCYTE [DISTWIDTH] IN BLOOD BY AUTOMATED COUNT: 12.6 % (ref 11.5–14.5)
GLUCOSE SERPL-MCNC: 89 MG/DL (ref 74–99)
HCT VFR BLD AUTO: 45.7 % (ref 36–46)
HDLC SERPL-MCNC: 86.9 MG/DL
HGB BLD-MCNC: 15.1 G/DL (ref 12–16)
IMM GRANULOCYTES # BLD AUTO: 0.06 X10*3/UL (ref 0–0.7)
IMM GRANULOCYTES NFR BLD AUTO: 0.4 % (ref 0–0.9)
LDLC SERPL CALC-MCNC: 57 MG/DL
LYMPHOCYTES # BLD AUTO: 1.95 X10*3/UL (ref 1.2–4.8)
LYMPHOCYTES NFR BLD AUTO: 13.3 %
MCH RBC QN AUTO: 29.7 PG (ref 26–34)
MCHC RBC AUTO-ENTMCNC: 33 G/DL (ref 32–36)
MCV RBC AUTO: 90 FL (ref 80–100)
MONOCYTES # BLD AUTO: 0.74 X10*3/UL (ref 0.1–1)
MONOCYTES NFR BLD AUTO: 5 %
NEUTROPHILS # BLD AUTO: 11.63 X10*3/UL (ref 1.2–7.7)
NEUTROPHILS NFR BLD AUTO: 79.1 %
NON HDL CHOLESTEROL: 80 MG/DL (ref 0–149)
NRBC BLD-RTO: 0.1 /100 WBCS (ref 0–0)
PLATELET # BLD AUTO: 381 X10*3/UL (ref 150–450)
POC APPEARANCE, URINE: CLEAR
POC BILIRUBIN, URINE: NEGATIVE
POC BLOOD, URINE: NEGATIVE
POC COLOR, URINE: ABNORMAL
POC GLUCOSE, URINE: NEGATIVE MG/DL
POC KETONES, URINE: NEGATIVE MG/DL
POC LEUKOCYTES, URINE: NEGATIVE
POC NITRITE,URINE: NEGATIVE
POC PH, URINE: 6 PH
POC PROTEIN, URINE: NEGATIVE MG/DL
POC SPECIFIC GRAVITY, URINE: >=1.03
POC UROBILINOGEN, URINE: 0.2 EU/DL
POTASSIUM SERPL-SCNC: 4.3 MMOL/L (ref 3.5–5.3)
PROT SERPL-MCNC: 7.4 G/DL (ref 6.4–8.2)
RBC # BLD AUTO: 5.08 X10*6/UL (ref 4–5.2)
SODIUM SERPL-SCNC: 137 MMOL/L (ref 136–145)
TRIGL SERPL-MCNC: 115 MG/DL (ref 0–149)
TSH SERPL-ACNC: 1.79 MIU/L (ref 0.44–3.98)
VLDL: 23 MG/DL (ref 0–40)
WBC # BLD AUTO: 14.7 X10*3/UL (ref 4.4–11.3)

## 2024-06-07 PROCEDURE — 3074F SYST BP LT 130 MM HG: CPT | Performed by: STUDENT IN AN ORGANIZED HEALTH CARE EDUCATION/TRAINING PROGRAM

## 2024-06-07 PROCEDURE — 81002 URINALYSIS NONAUTO W/O SCOPE: CPT | Performed by: STUDENT IN AN ORGANIZED HEALTH CARE EDUCATION/TRAINING PROGRAM

## 2024-06-07 PROCEDURE — 80061 LIPID PANEL: CPT

## 2024-06-07 PROCEDURE — 80053 COMPREHEN METABOLIC PANEL: CPT

## 2024-06-07 PROCEDURE — 99396 PREV VISIT EST AGE 40-64: CPT | Performed by: STUDENT IN AN ORGANIZED HEALTH CARE EDUCATION/TRAINING PROGRAM

## 2024-06-07 PROCEDURE — 84443 ASSAY THYROID STIM HORMONE: CPT

## 2024-06-07 PROCEDURE — 85025 COMPLETE CBC W/AUTO DIFF WBC: CPT

## 2024-06-07 PROCEDURE — 93000 ELECTROCARDIOGRAM COMPLETE: CPT | Performed by: STUDENT IN AN ORGANIZED HEALTH CARE EDUCATION/TRAINING PROGRAM

## 2024-06-07 PROCEDURE — 1036F TOBACCO NON-USER: CPT | Performed by: STUDENT IN AN ORGANIZED HEALTH CARE EDUCATION/TRAINING PROGRAM

## 2024-06-07 PROCEDURE — 36415 COLL VENOUS BLD VENIPUNCTURE: CPT

## 2024-06-07 PROCEDURE — 99213 OFFICE O/P EST LOW 20 MIN: CPT | Performed by: STUDENT IN AN ORGANIZED HEALTH CARE EDUCATION/TRAINING PROGRAM

## 2024-06-07 PROCEDURE — 83036 HEMOGLOBIN GLYCOSYLATED A1C: CPT

## 2024-06-07 PROCEDURE — 3078F DIAST BP <80 MM HG: CPT | Performed by: STUDENT IN AN ORGANIZED HEALTH CARE EDUCATION/TRAINING PROGRAM

## 2024-06-07 RX ORDER — ROSUVASTATIN CALCIUM 20 MG/1
20 TABLET, COATED ORAL DAILY
Qty: 90 TABLET | Refills: 3 | Status: SHIPPED | OUTPATIENT
Start: 2024-06-07

## 2024-06-07 RX ORDER — METOPROLOL SUCCINATE 100 MG/1
100 TABLET, EXTENDED RELEASE ORAL DAILY
Qty: 90 TABLET | Refills: 3 | Status: SHIPPED | OUTPATIENT
Start: 2024-06-07

## 2024-06-07 RX ORDER — OLMESARTAN MEDOXOMIL 20 MG/1
20 TABLET ORAL DAILY
Qty: 90 TABLET | Refills: 3 | Status: SHIPPED | OUTPATIENT
Start: 2024-06-07

## 2024-06-07 ASSESSMENT — PATIENT HEALTH QUESTIONNAIRE - PHQ9
SUM OF ALL RESPONSES TO PHQ9 QUESTIONS 1 AND 2: 0
2. FEELING DOWN, DEPRESSED OR HOPELESS: NOT AT ALL
1. LITTLE INTEREST OR PLEASURE IN DOING THINGS: NOT AT ALL

## 2024-06-07 NOTE — PROGRESS NOTES
Subjective   Patient ID: Diamond Mitchell is a 44 y.o. female who presents for Annual Exam.  Today she is accompanied by alone.     HPI  1. Health Maintenance  Patient overall feeling well  Immunization: Tdap 2021  Influenza vaccines yearly  OB/GYN: last pap smear 2015, mammogram 9/29/2023 with a BI-RADS Category 2  Colon Cancer Screening: no family history, willing to discuss Cologuard  Diet/Exercise: Attempts to eat a balanced diet and exercise regularly  Tobacco: denies use  EtOH: rarely     2. Hypertension  Taking metoprolol 100 mg daily and olmesartan 20 mg daily as prescribed   Tolerating well without side effects   Denies cardiopulmonary symptoms   Requesting refill today     3. Hyperlipidemia:   Total cholesterol 186, HDL 76, LDL 78, triglycerides 157  Currently on rosuvastatin 20 mg as prescribed.   Requesting refill.    4. PCOS  Continues to follow up with Endocrinologist  Denies any concerns or complaints.  Hemoglobin A1c at 6.1%  Continues to take OCP and metformin alongside spironolactone    Current Outpatient Medications on File Prior to Visit   Medication Sig Dispense Refill    metFORMIN  mg 24 hr tablet Take 2 tablets (1,000 mg) by mouth 2 times a day. 360 tablet 3    norgestimate-ethinyl estradioL (Sprintec, 28,) 0.25-35 mg-mcg tablet Take 1 tablet by mouth once daily. 84 tablet 3    spironolactone (Aldactone) 100 mg tablet Take 1 tablet (100 mg) by mouth 2 times a day. 180 tablet 3    [DISCONTINUED] metoprolol succinate XL (Toprol-XL) 100 mg 24 hr tablet Take 1 tablet (100 mg) by mouth once daily. 30 tablet 0    [DISCONTINUED] olmesartan (BENIcar) 20 mg tablet Take 1 tablet (20 mg) by mouth once daily. 90 tablet 0    [DISCONTINUED] rosuvastatin (Crestor) 20 mg tablet Take 1 tablet (20 mg) by mouth once daily. 30 tablet 0     No current facility-administered medications on file prior to visit.        Allergies   Allergen Reactions    Lisinopril Other       Immunization History   Administered  Date(s) Administered    Moderna SARS-CoV-2 Vaccination 01/09/2021, 02/03/2021    Tdap vaccine, age 7 year and older (BOOSTRIX, ADACEL) 05/03/2021         Review of Systems  All pertinent positive symptoms are included in the history of present illness.  All other systems have been reviewed and are negative and noncontributory to this patient's current ailments.     Objective   /70 (BP Location: Left arm, Patient Position: Sitting, BP Cuff Size: Large adult)   Pulse 80   Wt 128 kg (283 lb)   SpO2 97%   BMI 47.09 kg/m²   BSA: 2.42 meters squared  No visits with results within 1 Month(s) from this visit.   Latest known visit with results is:   Lab on 01/17/2024   Component Date Value Ref Range Status    Glucose 01/17/2024 110 (H)  74 - 99 mg/dL Final    Sodium 01/17/2024 138  136 - 145 mmol/L Final    Potassium 01/17/2024 5.0  3.5 - 5.3 mmol/L Final    Chloride 01/17/2024 103  98 - 107 mmol/L Final    Bicarbonate 01/17/2024 26  21 - 32 mmol/L Final    Anion Gap 01/17/2024 14  10 - 20 mmol/L Final    Urea Nitrogen 01/17/2024 15  6 - 23 mg/dL Final    Creatinine 01/17/2024 0.68  0.50 - 1.05 mg/dL Final    eGFR 01/17/2024 >90  >60 mL/min/1.73m*2 Final    Calculations of estimated GFR are performed using the 2021 CKD-EPI Study Refit equation without the race variable for the IDMS-Traceable creatinine methods.  https://jasn.asnjournals.org/content/early/2021/09/22/ASN.1204893489    Calcium 01/17/2024 9.4  8.6 - 10.3 mg/dL Final    Albumin 01/17/2024 3.9  3.4 - 5.0 g/dL Final    Alkaline Phosphatase 01/17/2024 81  33 - 110 U/L Final    Total Protein 01/17/2024 6.4  6.4 - 8.2 g/dL Final    AST 01/17/2024 11  9 - 39 U/L Final    Bilirubin, Total 01/17/2024 0.5  0.0 - 1.2 mg/dL Final    ALT 01/17/2024 12  7 - 45 U/L Final    Patients treated with Sulfasalazine may generate falsely decreased results for ALT.    Hemoglobin A1C 01/17/2024 6.1 (H)  see below % Final    Estimated Average Glucose 01/17/2024 128  Not  Established mg/dL Final    Cholesterol 01/17/2024 186  0 - 199 mg/dL Final          Age      Desirable   Borderline High   High     0-19 Y     0 - 169       170 - 199     >/= 200    20-24 Y     0 - 189       190 - 224     >/= 225         >24 Y     0 - 199       200 - 239     >/= 240   **All ranges are based on fasting samples. Specific   therapeutic targets will vary based on patient-specific   cardiac risk.    Pediatric guidelines reference:Pediatrics 2011, 128(S5).Adult guidelines reference: NCEP ATPIII Guidelines,LOR 2001, 258:2486-97    Venipuncture immediately after or during the administration of Metamizole may lead to falsely low results. Testing should be performed immediately prior to Metamizole dosing.    HDL-Cholesterol 01/17/2024 76.6  mg/dL Final      Age       Very Low   Low     Normal    High    0-19 Y    < 35      < 40     40-45     ----  20-24 Y    ----     < 40      >45      ----        >24 Y      ----     < 40     40-60      >60      Cholesterol/HDL Ratio 01/17/2024 2.4   Final      Ref Values  Desirable  < 3.4  High Risk  > 5.0    LDL Calculated 01/17/2024 78  <=99 mg/dL Final                                Near   Borderline      AGE      Desirable  Optimal    High     High     Very High     0-19 Y     0 - 109     ---    110-129   >/= 130     ----    20-24 Y     0 - 119     ---    120-159   >/= 160     ----      >24 Y     0 -  99   100-129  130-159   160-189     >/=190      VLDL 01/17/2024 31  0 - 40 mg/dL Final    Triglycerides 01/17/2024 157 (H)  0 - 149 mg/dL Final       Age         Desirable   Borderline High   High     Very High   0 D-90 D    19 - 174         ----         ----        ----  91 D- 9 Y     0 -  74        75 -  99     >/= 100      ----    10-19 Y     0 -  89        90 - 129     >/= 130      ----    20-24 Y     0 - 114       115 - 149     >/= 150      ----         >24 Y     0 - 149       150 - 199    200- 499    >/= 500    Venipuncture immediately after or during the  administration of Metamizole may lead to falsely low results. Testing should be performed immediately prior to Metamizole dosing.    Non HDL Cholesterol 01/17/2024 109  0 - 149 mg/dL Final          Age       Desirable   Borderline High   High     Very High     0-19 Y     0 - 119       120 - 144     >/= 145    >/= 160    20-24 Y     0 - 149       150 - 189     >/= 190      ----         >24 Y    30 mg/dL above LDL Cholesterol goal         Physical Exam  CONSTITUTIONAL - well nourished, well developed, looks like stated age, in no acute distress, not ill-appearing, and not tired appearing  SKIN - normal skin color and pigmentation, normal skin turgor without rash, lesions, or nodules visualized  HEAD - no trauma, normocephalic  EYES - normal external exam  NECK - supple without rigidity, no neck mass was observed, no thyromegaly or thyroid nodules  CHEST - clear to auscultation, no wheezing, no crackles and no rales, good effort  CARDIAC - regular rate and regular rhythm, no skipped beats, no murmur  EXTREMITIES - no edema, no deformities  NEUROLOGICAL - normal gait, normal balance, normal motor, no ataxia  PSYCHIATRIC - alert, pleasant and cordial, age-appropriate  IMMUNOLOGIC - no cervical lymphadenopathy     Assessment/Plan   1. Health Maintenance   Complete history and physical examination was performed  EKG reveals sinus rhythm without any acute changes  UA done in office  We will notify of test results once available and make treatment recommendations accordingly  You are due for pap testing, and information for a new OB/GYN was provided today once again  A mammogram was placed in your chart to be done at your earliest mutual convenience  Also Cologuard was sent to start colon cancer screening     2. Hypertension  Stable, please continue current medications as prescribed.   Refills for olmesartan 20 mg and metoprolol 100 mg daily were sent to your pharmacy today.   I would like to have you monitor and record  blood pressures at home   Blood pressure goal should be below 130/80, ideally 120/80      3. Hyperlipidemia:   A cholesterol panel was ordered after today's visit  We will notify you of test results once available and make treatment recommendations accordingly.   In the meantime, please continue to take rosuvastatin 20 mg as prescribed.  We ordered a CT cardiac score to be fully thorough  Please have this done at your earliest major convenience    4. PCOS  Please continue to follow up with your endocrinologist.  Otherwise, continue medications as prescribed per their protocol    Please follow up in 12 months for continued care as well as your physical    In the future we may discuss going back down to every 6 months

## 2024-06-08 LAB
EST. AVERAGE GLUCOSE BLD GHB EST-MCNC: 131 MG/DL
HBA1C MFR BLD: 6.2 %

## 2024-06-09 NOTE — RESULT ENCOUNTER NOTE
Hemoglobin A1c still shows prediabetes at 6.2%    Complete blood cell count continues to show an elevated white blood cell count as well as an elevated neutrophil count  Does the patient feel well?    I know the patient did follow-up with a blood specialty provider in the past but it may be an idea to follow-up once again  Please advise    Thyroid well within normal limits    Cholesterol is one of the best on file at 167, HDL 86, LDL 57, triglycerides 115    Sugar, kidneys, liver, electrolytes are all within normal limits

## 2024-06-27 ENCOUNTER — APPOINTMENT (OUTPATIENT)
Dept: OTOLARYNGOLOGY | Facility: CLINIC | Age: 44
End: 2024-06-27
Payer: COMMERCIAL

## 2024-07-09 LAB — NONINV COLON CA DNA+OCC BLD SCRN STL QL: NEGATIVE

## 2024-07-09 NOTE — RESULT ENCOUNTER NOTE
Coljenniffer for colon cancer screening is negative    We will continue screening every 3 years, due 2027

## 2024-07-18 ENCOUNTER — APPOINTMENT (OUTPATIENT)
Dept: OTOLARYNGOLOGY | Facility: CLINIC | Age: 44
End: 2024-07-18
Payer: COMMERCIAL

## 2024-08-15 ENCOUNTER — APPOINTMENT (OUTPATIENT)
Dept: AUDIOLOGY | Facility: CLINIC | Age: 44
End: 2024-08-15
Payer: COMMERCIAL

## 2024-10-04 ENCOUNTER — HOSPITAL ENCOUNTER (OUTPATIENT)
Dept: RADIOLOGY | Facility: HOSPITAL | Age: 44
Discharge: HOME | End: 2024-10-04
Payer: COMMERCIAL

## 2024-10-04 DIAGNOSIS — E78.5 DYSLIPIDEMIA: ICD-10-CM

## 2024-10-04 PROCEDURE — 75571 CT HRT W/O DYE W/CA TEST: CPT

## 2024-10-10 DIAGNOSIS — E28.2 POLYCYSTIC OVARIAN SYNDROME: ICD-10-CM

## 2024-10-10 RX ORDER — METFORMIN HYDROCHLORIDE 500 MG/1
1000 TABLET, EXTENDED RELEASE ORAL 2 TIMES DAILY
Qty: 360 TABLET | Refills: 1 | Status: SHIPPED | OUTPATIENT
Start: 2024-10-10 | End: 2025-10-10

## 2024-10-10 RX ORDER — METFORMIN HYDROCHLORIDE 500 MG/1
1000 TABLET, EXTENDED RELEASE ORAL 2 TIMES DAILY
Qty: 360 TABLET | Refills: 1 | Status: SHIPPED | OUTPATIENT
Start: 2024-10-10

## 2024-10-25 ENCOUNTER — HOSPITAL ENCOUNTER (OUTPATIENT)
Dept: RADIOLOGY | Facility: HOSPITAL | Age: 44
Discharge: HOME | End: 2024-10-25
Payer: COMMERCIAL

## 2024-10-25 VITALS — WEIGHT: 280 LBS | HEIGHT: 65 IN | BODY MASS INDEX: 46.65 KG/M2

## 2024-10-25 DIAGNOSIS — Z12.31 ENCOUNTER FOR SCREENING MAMMOGRAM FOR MALIGNANT NEOPLASM OF BREAST: ICD-10-CM

## 2024-10-25 PROCEDURE — 77067 SCR MAMMO BI INCL CAD: CPT | Performed by: RADIOLOGY

## 2024-10-25 PROCEDURE — 77067 SCR MAMMO BI INCL CAD: CPT

## 2024-10-25 PROCEDURE — 77063 BREAST TOMOSYNTHESIS BI: CPT | Performed by: RADIOLOGY

## 2024-12-06 ENCOUNTER — APPOINTMENT (OUTPATIENT)
Dept: ENDOCRINOLOGY | Facility: CLINIC | Age: 44
End: 2024-12-06
Payer: COMMERCIAL

## 2024-12-06 VITALS
BODY MASS INDEX: 47.93 KG/M2 | HEIGHT: 65 IN | WEIGHT: 287.7 LBS | DIASTOLIC BLOOD PRESSURE: 76 MMHG | HEART RATE: 77 BPM | RESPIRATION RATE: 16 BRPM | SYSTOLIC BLOOD PRESSURE: 124 MMHG

## 2024-12-06 DIAGNOSIS — E28.2 POLYCYSTIC OVARIAN SYNDROME: ICD-10-CM

## 2024-12-06 DIAGNOSIS — E78.5 DYSLIPIDEMIA: ICD-10-CM

## 2024-12-06 DIAGNOSIS — R73.03 PRE-DIABETES: Primary | ICD-10-CM

## 2024-12-06 PROCEDURE — 3074F SYST BP LT 130 MM HG: CPT | Performed by: INTERNAL MEDICINE

## 2024-12-06 PROCEDURE — 99213 OFFICE O/P EST LOW 20 MIN: CPT | Performed by: INTERNAL MEDICINE

## 2024-12-06 PROCEDURE — 3078F DIAST BP <80 MM HG: CPT | Performed by: INTERNAL MEDICINE

## 2024-12-06 PROCEDURE — 3008F BODY MASS INDEX DOCD: CPT | Performed by: INTERNAL MEDICINE

## 2024-12-06 PROCEDURE — 1036F TOBACCO NON-USER: CPT | Performed by: INTERNAL MEDICINE

## 2024-12-06 RX ORDER — METFORMIN HYDROCHLORIDE 500 MG/1
1000 TABLET, EXTENDED RELEASE ORAL 2 TIMES DAILY
Qty: 360 TABLET | Refills: 3 | Status: SHIPPED | OUTPATIENT
Start: 2024-12-06

## 2024-12-06 RX ORDER — SPIRONOLACTONE 100 MG/1
100 TABLET, FILM COATED ORAL 2 TIMES DAILY
Qty: 180 TABLET | Refills: 3 | Status: SHIPPED | OUTPATIENT
Start: 2024-12-06 | End: 2025-12-06

## 2024-12-06 RX ORDER — NORGESTIMATE AND ETHINYL ESTRADIOL 0.25-0.035
1 KIT ORAL DAILY
Qty: 84 TABLET | Refills: 3 | Status: SHIPPED | OUTPATIENT
Start: 2024-12-06 | End: 2025-12-06

## 2024-12-06 ASSESSMENT — ENCOUNTER SYMPTOMS
SHORTNESS OF BREATH: 0
DIARRHEA: 0
HEADACHES: 0
FEVER: 0
NAUSEA: 0
COUGH: 0
PALPITATIONS: 0
CHILLS: 0
VOMITING: 0
FATIGUE: 0

## 2024-12-06 NOTE — ASSESSMENT & PLAN NOTE
Continue ocps and spirono  Orders:    metFORMIN  mg 24 hr tablet; Take 2 tablets (1,000 mg) by mouth 2 times a day.    norgestimate-ethinyl estradioL (Sprintec, 28,) 0.25-35 mg-mcg tablet; Take 1 tablet by mouth once daily.    spironolactone (Aldactone) 100 mg tablet; Take 1 tablet (100 mg) by mouth 2 times a day.

## 2024-12-06 NOTE — PROGRESS NOTES
Endocrinology: Follow up visit  Subjective   Patient ID: Diamond Mitchell is a 44 y.o. female who presents for Polycystic Ovary Syndrome, Prediabetes, and Hyperlipidemia.    PCP: Samson Vaca,     HPI  Last seen a year ago for predm, pcos and lipids.   Weight is stable but knows she needs to lose.  Works long hours so exercise is difficult.  Knows she could do better with eating habits.  Feeling fine, tolerates metformin with no issues  Hair growth improved on spironolactone    Review of Systems   Constitutional:  Negative for chills, fatigue and fever.   Respiratory:  Negative for cough and shortness of breath.    Cardiovascular:  Negative for chest pain and palpitations.   Gastrointestinal:  Negative for diarrhea, nausea and vomiting.   Neurological:  Negative for headaches.       Patient Active Problem List   Diagnosis    Dyslipidemia    Hypertension    Chronic left-sided low back pain with left-sided sciatica    Metabolic syndrome    Neutrophilic leukocytosis    Obesity    Polycystic ovarian syndrome    Bilateral hip pain    Prediabetes        Home Meds:  Current Outpatient Medications   Medication Instructions    metFORMIN XR (GLUCOPHAGE-XR) 1,000 mg, oral, 2 times daily    metFORMIN XR (GLUCOPHAGE-XR) 1,000 mg, oral, 2 times daily    metoprolol succinate XL (TOPROL-XL) 100 mg, oral, Daily    norgestimate-ethinyl estradioL (Sprintec, 28,) 0.25-35 mg-mcg tablet 1 tablet, oral, Daily    olmesartan (BENICAR) 20 mg, oral, Daily    rosuvastatin (CRESTOR) 20 mg, oral, Daily    spironolactone (ALDACTONE) 100 mg, oral, 2 times daily        Allergies   Allergen Reactions    Lisinopril Other        Objective   Vitals:    12/06/24 1025   BP: 124/76   Pulse: 77   Resp: 16      Vitals:    12/06/24 1025   Weight: 130 kg (287 lb 11.2 oz)      Body mass index is 47.88 kg/m².   Physical Exam  Constitutional:       Appearance: Normal appearance. She is overweight.   HENT:      Head: Normocephalic and atraumatic.   Neck:  "     Thyroid: No thyroid mass, thyromegaly or thyroid tenderness.   Cardiovascular:      Rate and Rhythm: Normal rate and regular rhythm.      Heart sounds: No murmur heard.     No gallop.   Pulmonary:      Effort: Pulmonary effort is normal.      Breath sounds: Normal breath sounds.   Abdominal:      Palpations: Abdomen is soft.      Comments: benign   Neurological:      General: No focal deficit present.      Mental Status: She is alert and oriented to person, place, and time.      Deep Tendon Reflexes: Reflexes are normal and symmetric.   Psychiatric:         Behavior: Behavior is cooperative.         Labs:  Lab Results   Component Value Date    HGBA1C 6.2 (H) 06/07/2024    TSH 1.79 06/07/2024      No results found for: \"PR1\", \"THYROIDPAB\", \"TSI\"     Assessment/Plan   Assessment & Plan  Pre-diabetes  Labs today  Encouraged focus on eating  Continue metformin  Orders:    Comprehensive Metabolic Panel; Future    Hemoglobin A1C; Future    Polycystic ovarian syndrome  Continue ocps and spirono  Orders:    metFORMIN  mg 24 hr tablet; Take 2 tablets (1,000 mg) by mouth 2 times a day.    norgestimate-ethinyl estradioL (Sprintec, 28,) 0.25-35 mg-mcg tablet; Take 1 tablet by mouth once daily.    spironolactone (Aldactone) 100 mg tablet; Take 1 tablet (100 mg) by mouth 2 times a day.    Dyslipidemia    Stable on statin      Electronically signed by:  Nadine Rees MD 12/06/24 10:46 AM              "

## 2025-02-20 ENCOUNTER — APPOINTMENT (OUTPATIENT)
Dept: OTOLARYNGOLOGY | Facility: CLINIC | Age: 45
End: 2025-02-20
Payer: COMMERCIAL

## 2025-03-01 LAB
ALBUMIN SERPL-MCNC: 4 G/DL (ref 3.6–5.1)
ALP SERPL-CCNC: 74 U/L (ref 31–125)
ALT SERPL-CCNC: 9 U/L (ref 6–29)
ANION GAP SERPL CALCULATED.4IONS-SCNC: 12 MMOL/L (CALC) (ref 7–17)
AST SERPL-CCNC: 10 U/L (ref 10–35)
BILIRUB SERPL-MCNC: 0.4 MG/DL (ref 0.2–1.2)
BUN SERPL-MCNC: 12 MG/DL (ref 7–25)
CALCIUM SERPL-MCNC: 9.4 MG/DL (ref 8.6–10.2)
CHLORIDE SERPL-SCNC: 103 MMOL/L (ref 98–110)
CO2 SERPL-SCNC: 24 MMOL/L (ref 20–32)
CREAT SERPL-MCNC: 0.66 MG/DL (ref 0.5–0.99)
EGFRCR SERPLBLD CKD-EPI 2021: 109 ML/MIN/1.73M2
EST. AVERAGE GLUCOSE BLD GHB EST-MCNC: 137 MG/DL
EST. AVERAGE GLUCOSE BLD GHB EST-SCNC: 7.6 MMOL/L
GLUCOSE SERPL-MCNC: 112 MG/DL (ref 65–99)
HBA1C MFR BLD: 6.4 % OF TOTAL HGB
POTASSIUM SERPL-SCNC: 4.8 MMOL/L (ref 3.5–5.3)
PROT SERPL-MCNC: 6.7 G/DL (ref 6.1–8.1)
SODIUM SERPL-SCNC: 139 MMOL/L (ref 135–146)

## 2025-03-13 ENCOUNTER — APPOINTMENT (OUTPATIENT)
Dept: OBSTETRICS AND GYNECOLOGY | Facility: CLINIC | Age: 45
End: 2025-03-13
Payer: COMMERCIAL

## 2025-04-01 ENCOUNTER — APPOINTMENT (OUTPATIENT)
Dept: OTOLARYNGOLOGY | Facility: CLINIC | Age: 45
End: 2025-04-01
Payer: COMMERCIAL

## 2025-04-01 ENCOUNTER — APPOINTMENT (OUTPATIENT)
Dept: AUDIOLOGY | Facility: CLINIC | Age: 45
End: 2025-04-01
Payer: COMMERCIAL

## 2025-05-02 ENCOUNTER — APPOINTMENT (OUTPATIENT)
Dept: OBSTETRICS AND GYNECOLOGY | Facility: CLINIC | Age: 45
End: 2025-05-02
Payer: COMMERCIAL

## 2025-05-02 VITALS
HEIGHT: 65 IN | BODY MASS INDEX: 48.72 KG/M2 | SYSTOLIC BLOOD PRESSURE: 134 MMHG | DIASTOLIC BLOOD PRESSURE: 80 MMHG | WEIGHT: 292.4 LBS

## 2025-05-02 DIAGNOSIS — Z12.31 ENCOUNTER FOR SCREENING MAMMOGRAM FOR MALIGNANT NEOPLASM OF BREAST: ICD-10-CM

## 2025-05-02 DIAGNOSIS — Z12.4 CERVICAL CANCER SCREENING: Primary | ICD-10-CM

## 2025-05-02 DIAGNOSIS — N63.12 MASS OF UPPER INNER QUADRANT OF RIGHT BREAST: ICD-10-CM

## 2025-05-02 DIAGNOSIS — Z01.419 WELL WOMAN EXAM WITH ROUTINE GYNECOLOGICAL EXAM: ICD-10-CM

## 2025-05-02 PROCEDURE — 3008F BODY MASS INDEX DOCD: CPT | Performed by: STUDENT IN AN ORGANIZED HEALTH CARE EDUCATION/TRAINING PROGRAM

## 2025-05-02 PROCEDURE — 3079F DIAST BP 80-89 MM HG: CPT | Performed by: STUDENT IN AN ORGANIZED HEALTH CARE EDUCATION/TRAINING PROGRAM

## 2025-05-02 PROCEDURE — 87626 HPV SEP HI-RSK TYP&POOL RSLT: CPT

## 2025-05-02 PROCEDURE — 88175 CYTOPATH C/V AUTO FLUID REDO: CPT

## 2025-05-02 PROCEDURE — 1036F TOBACCO NON-USER: CPT | Performed by: STUDENT IN AN ORGANIZED HEALTH CARE EDUCATION/TRAINING PROGRAM

## 2025-05-02 PROCEDURE — 99386 PREV VISIT NEW AGE 40-64: CPT | Performed by: STUDENT IN AN ORGANIZED HEALTH CARE EDUCATION/TRAINING PROGRAM

## 2025-05-02 PROCEDURE — 3075F SYST BP GE 130 - 139MM HG: CPT | Performed by: STUDENT IN AN ORGANIZED HEALTH CARE EDUCATION/TRAINING PROGRAM

## 2025-05-02 ASSESSMENT — ENCOUNTER SYMPTOMS
ALLERGIC/IMMUNOLOGIC NEGATIVE: 0
CARDIOVASCULAR NEGATIVE: 0
EYES NEGATIVE: 0
NEUROLOGICAL NEGATIVE: 0
HEMATOLOGIC/LYMPHATIC NEGATIVE: 0
RESPIRATORY NEGATIVE: 0
ENDOCRINE NEGATIVE: 0
GASTROINTESTINAL NEGATIVE: 0
PSYCHIATRIC NEGATIVE: 0
MUSCULOSKELETAL NEGATIVE: 0
CONSTITUTIONAL NEGATIVE: 0

## 2025-05-02 ASSESSMENT — PAIN SCALES - GENERAL: PAINLEVEL_OUTOF10: 0-NO PAIN

## 2025-05-02 NOTE — PROGRESS NOTES
Subjective   Diamond Mitchell is a 45 y.o.  who presents today for an annual exam.     -Diet: horrible - carb heavy, does have processed foods,  does cook for them   -Exercise: not really    GynHx:  -Menses: regular menses on OCPs, moderate, not painful  -LMP:   -Sexual Activity: yes  -Sexual Concerns: no  -Contraception: OCPs  -Barrier Protection: no  -Prior STIs:  no  -STI Screening: no   -Last pap:   NILM HPV neg  No h/o abnormal pa smears     OBHx: P1,  x1  PMHx: PCOS (follows with endocrine), HTN, HLD  SurgHx:tonsillectomy  Social:   Rare etoh, no tobacco use, no drug use  Lives with  and daugther- 26 yo  Works as  [employer]  FHx:  Father- pancreatic cancer  Paternal grandmother- breast, bone cancer  No other fam hx cancer    Objective   Physical Exam  Vitals:    25 1133   BP: 134/80      Gen: awake, alert  Head: NCAT  HEENT: moist mucus membranes  Breast:   Breast exam noteable for palpable, mobile 1.5 cm breast mass 10 cm from nipple at 3 o clock location on upper inner quadrant of R breast  Otherwise normal breast exam   Pulm: breathing comfortably on room air  CV: warm and well-perfused  Abd: soft non tender non distended  :   Normal vulva  Normal vaginal mucosa  Neuro: alert and oriented  Psych: appropriate affect     Assessment/Plan     Diamond Mitchell is a 45 y.o.  who presents today for an annual exam.     Health Maintenance  -Pap: today  -Mammogram: dx mammogram ordered today for R breast, FAST MRI given denser breasts and family history  -Colonoscopy: Cologuard neg 2024  -STI: declines  -Contraception: OCPs  -Encouraged healthy diet and modifications and exercise    Follow up in 1 year for annual exam     Kat Emery MD

## 2025-05-13 ENCOUNTER — HOSPITAL ENCOUNTER (OUTPATIENT)
Dept: RADIOLOGY | Facility: CLINIC | Age: 45
Discharge: HOME | End: 2025-05-13
Payer: COMMERCIAL

## 2025-05-13 DIAGNOSIS — Z12.31 ENCOUNTER FOR SCREENING MAMMOGRAM FOR MALIGNANT NEOPLASM OF BREAST: ICD-10-CM

## 2025-05-13 PROCEDURE — 77061 BREAST TOMOSYNTHESIS UNI: CPT | Mod: RIGHT SIDE | Performed by: STUDENT IN AN ORGANIZED HEALTH CARE EDUCATION/TRAINING PROGRAM

## 2025-05-13 PROCEDURE — 76982 USE 1ST TARGET LESION: CPT

## 2025-05-13 PROCEDURE — 76642 ULTRASOUND BREAST LIMITED: CPT | Mod: RIGHT SIDE | Performed by: STUDENT IN AN ORGANIZED HEALTH CARE EDUCATION/TRAINING PROGRAM

## 2025-05-13 PROCEDURE — 76642 ULTRASOUND BREAST LIMITED: CPT | Mod: RT

## 2025-05-13 PROCEDURE — 77065 DX MAMMO INCL CAD UNI: CPT | Mod: RIGHT SIDE | Performed by: STUDENT IN AN ORGANIZED HEALTH CARE EDUCATION/TRAINING PROGRAM

## 2025-05-13 PROCEDURE — 77065 DX MAMMO INCL CAD UNI: CPT | Mod: RT

## 2025-05-14 DIAGNOSIS — N63.12 MASS OF UPPER INNER QUADRANT OF RIGHT BREAST: Primary | ICD-10-CM

## 2025-05-20 ENCOUNTER — APPOINTMENT (OUTPATIENT)
Dept: AUDIOLOGY | Facility: CLINIC | Age: 45
End: 2025-05-20
Payer: COMMERCIAL

## 2025-05-23 ENCOUNTER — APPOINTMENT (OUTPATIENT)
Dept: RADIOLOGY | Facility: HOSPITAL | Age: 45
End: 2025-05-23
Payer: COMMERCIAL

## 2025-05-27 ENCOUNTER — APPOINTMENT (OUTPATIENT)
Dept: OTOLARYNGOLOGY | Facility: CLINIC | Age: 45
End: 2025-05-27
Payer: COMMERCIAL

## 2025-05-27 ENCOUNTER — APPOINTMENT (OUTPATIENT)
Dept: AUDIOLOGY | Facility: CLINIC | Age: 45
End: 2025-05-27
Payer: COMMERCIAL

## 2025-06-06 ENCOUNTER — APPOINTMENT (OUTPATIENT)
Dept: RADIOLOGY | Facility: HOSPITAL | Age: 45
End: 2025-06-06

## 2025-06-12 NOTE — PROGRESS NOTES
Subjective   Patient ID: Daimond Mitchell is a 45 y.o. female who presents for Annual Exam.  Today she is accompanied by alone.     HPI  1. Health Maintenance  Patient overall feeling well.  Denies any chest pain, shortness of breath or wheezing upon exertion.  Denies any fever chills or constitutional symptoms, denies any unintentional weight loss.  Denies any nausea/vomiting or constipation/diarrhea.  Denies any urinary symptoms.  Denies any recent change in hearing or vision.  Denies any lightheadedness, dizziness or balance problem  Immunization: Tdap 2021  Influenza vaccines yearly  OB/GYN: last pap smear 5/2025, mammogram 5/2025 with a BI-RADS Category 1  Colon Cancer Screening: no family history, Cologuard 7/2024 was negative, repeat in 2027  Diet/Exercise: Admits of not following a good diet and does not exercise  Tobacco: denies use  EtOH: rarely     2. Hypertension  Taking metoprolol 100 mg daily and olmesartan 20 mg daily as prescribed   Tolerating well without side effects   Denies cardiopulmonary symptoms   Requesting refill today     3. Hyperlipidemia:   Last lipid panel: Cholesterol 167, HDL 86, LDL 57, triglycerides 115   Currently on rosuvastatin 20 mg as prescribed.   Requesting refill.    4. PCOS/insuline resistance   Continues to follow up with Endocrinologist  Denies any concerns or complaints.  Hemoglobin A1c at 6.4%  Continues to take OCP and metformin alongside spironolactone    Current Outpatient Medications on File Prior to Visit   Medication Sig Dispense Refill    metFORMIN  mg 24 hr tablet Take 2 tablets (1,000 mg) by mouth 2 times a day. 360 tablet 1    metFORMIN  mg 24 hr tablet Take 2 tablets (1,000 mg) by mouth 2 times a day. 360 tablet 3    norgestimate-ethinyl estradioL (Sprintec, 28,) 0.25-35 mg-mcg tablet Take 1 tablet by mouth once daily. 84 tablet 3    spironolactone (Aldactone) 100 mg tablet Take 1 tablet (100 mg) by mouth 2 times a day. 180 tablet 3     "[DISCONTINUED] metoprolol succinate XL (Toprol-XL) 100 mg 24 hr tablet Take 1 tablet (100 mg) by mouth once daily. 90 tablet 3    [DISCONTINUED] olmesartan (BENIcar) 20 mg tablet Take 1 tablet (20 mg) by mouth once daily. 90 tablet 3    [DISCONTINUED] rosuvastatin (Crestor) 20 mg tablet Take 1 tablet (20 mg) by mouth once daily. 90 tablet 3     No current facility-administered medications on file prior to visit.        Allergies   Allergen Reactions    Lisinopril Other       Immunization History   Administered Date(s) Administered    Moderna SARS-CoV-2 Vaccination 01/09/2021, 02/03/2021    Tdap vaccine, age 7 year and older (BOOSTRIX, ADACEL) 05/03/2021         Review of Systems  All pertinent positive symptoms are included in the history of present illness.  All other systems have been reviewed and are negative and noncontributory to this patient's current ailments.     Objective   /80 (BP Location: Left arm, Patient Position: Sitting, BP Cuff Size: Large adult)   Pulse 86   Ht 1.651 m (5' 5\")   Wt 132 kg (292 lb)   SpO2 99%   BMI 48.59 kg/m²   BSA: 2.46 meters squared  No visits with results within 1 Month(s) from this visit.   Latest known visit with results is:   Office Visit on 05/02/2025   Component Date Value Ref Range Status    Case Report 05/02/2025    Final                    Value:Gynecologic Cytology                              Case: [id number]                                   Authorizing Provider:  aKt Emery MD           Collected:           05/02/2025 1253              Ordering Location:     [facility]      Received:            05/02/2025 1253                                     Center                                                                       First Screen:          Conchis Landa, CT                                                   Specimen:    ThinPrep Liquid-Based Pap-Imaging System Screen, CERVIX, SCREENING                         Final Cytological " Interpretation 05/02/2025    Final                    Value:    A. THINPREP PAP CERVIX, SCREENING -     Specimen Adequacy  Satisfactory for evaluation; endocervical/transformation zone component is present    General Categorization  Negative for intraepithelial lesion or malignancy.    Descriptive Interpretation  Negative for intraepithelial lesion or malignancy              05/02/2025    Final                    Value:Slide(s) initially screened by CADEN Flower at [facility] [address] [facility] [id number]  By the signature on this report, the individual or group listed as making the Final Interpretation/Diagnosis certifies that they have reviewed this case.       Educational Note 05/02/2025    Final                    Value:Cervical cytology is a screening procedure primarily for squamous cancers and precursors and has associated false-negative and false-positives results as evidenced by published data. Your patient's test should be interpreted in this context, together with the patient's history and clinical findings. Regular sampling and follow-up of unexplained clinical signs and symptoms are recommended to minimize false negative results.      Perform HPV HR test? 05/02/2025 Always (all interpretations)   Final    Include HPV Genotype? 05/02/2025 Yes   Final    LMP 05/02/2025 4/17/2025   Final    HPV, high-risk 05/02/2025 Negative  Negative Final    HPV Type 16 DNA 05/02/2025 Negative  Negative Final    HPV Type 18 DNA 05/02/2025 Negative  Negative Final    HPV non-Type 16 or 18 DNA 05/02/2025 Negative  Negative Final       Physical Exam  CONSTITUTIONAL - well nourished, well developed, looks like stated age, in no acute distress, not ill-appearing, and not tired appearing  SKIN - normal skin color and pigmentation, normal skin turgor without rash, lesions, or nodules visualized  HEAD - no trauma, normocephalic  EYES - normal external exam  NECK - supple without rigidity, no  neck mass was observed, no thyromegaly or thyroid nodules  CHEST - clear to auscultation, no wheezing, no crackles and no rales, good effort  CARDIAC - regular rate and regular rhythm, no skipped beats, no murmur  EXTREMITIES - no edema, no deformities  NEUROLOGICAL - normal gait, normal balance, normal motor, no ataxia  PSYCHIATRIC - alert, pleasant and cordial, age-appropriate  IMMUNOLOGIC - no cervical lymphadenopathy     Assessment/Plan   1. Health Maintenance   Complete history and physical examination was performed  EKG reveals sinus rhythm without any acute changes  Requisition for routine lab work was ordered today  We will notify of test results once available and make treatment recommendations accordingly  Up-to-date with Clarice, next due in 2027.  Up-to-date with Pap smear mammogram, continue following with OB/GYN.  Educated about the importance of conducting a healthy lifestyle, following well-balanced diet and exercising regularly    2. Hypertension  Stable, please continue current medications as prescribed.   Refills for olmesartan 20 mg and metoprolol 100 mg daily were sent to your pharmacy today.   I would like to have you monitor and record blood pressures at home   Blood pressure goal should be below 130/80, ideally 120/80      3. Hyperlipidemia:   A cholesterol panel was ordered after today's visit  We will notify you of test results once available and make treatment recommendations accordingly.   In the meantime, please continue to take rosuvastatin 20 mg as prescribed.  10/2024 CT cardiac score was 0    4. PCOS/insulin resistance  Please continue to follow up with your endocrinologist.  Otherwise, continue medications as prescribed per their protocol    Thank you for letting us be a part of your care team.  Please call the office if you have further questions or concerns regarding your care    Otherwise, please follow-up in 6 months for continued care and refills as well as your yearly  physical examination    Valentin Salazar MD  PGY2,  Resident  06/13/25

## 2025-06-13 ENCOUNTER — APPOINTMENT (OUTPATIENT)
Dept: PRIMARY CARE | Facility: CLINIC | Age: 45
End: 2025-06-13
Payer: COMMERCIAL

## 2025-06-13 VITALS
HEIGHT: 65 IN | WEIGHT: 292 LBS | OXYGEN SATURATION: 99 % | BODY MASS INDEX: 48.65 KG/M2 | SYSTOLIC BLOOD PRESSURE: 124 MMHG | DIASTOLIC BLOOD PRESSURE: 80 MMHG | HEART RATE: 86 BPM

## 2025-06-13 DIAGNOSIS — Z00.00 HEALTHCARE MAINTENANCE: Primary | ICD-10-CM

## 2025-06-13 DIAGNOSIS — E78.5 DYSLIPIDEMIA: ICD-10-CM

## 2025-06-13 DIAGNOSIS — Z13.6 SCREENING FOR CARDIOVASCULAR CONDITION: ICD-10-CM

## 2025-06-13 DIAGNOSIS — Z13.1 SCREENING FOR DIABETES MELLITUS: ICD-10-CM

## 2025-06-13 DIAGNOSIS — Z13.29 SCREENING FOR THYROID DISORDER: ICD-10-CM

## 2025-06-13 DIAGNOSIS — Z13.0 SCREENING FOR BLOOD DISEASE: ICD-10-CM

## 2025-06-13 DIAGNOSIS — I10 PRIMARY HYPERTENSION: ICD-10-CM

## 2025-06-13 DIAGNOSIS — E28.2 POLYCYSTIC OVARIAN SYNDROME: ICD-10-CM

## 2025-06-13 DIAGNOSIS — E88.810 METABOLIC SYNDROME: ICD-10-CM

## 2025-06-13 LAB
POC APPEARANCE, URINE: CLEAR
POC BILIRUBIN, URINE: NEGATIVE
POC BLOOD, URINE: NEGATIVE
POC COLOR, URINE: YELLOW
POC GLUCOSE, URINE: NEGATIVE MG/DL
POC KETONES, URINE: NEGATIVE MG/DL
POC LEUKOCYTES, URINE: NEGATIVE
POC NITRITE,URINE: NEGATIVE
POC PH, URINE: 6 PH
POC PROTEIN, URINE: NEGATIVE MG/DL
POC SPECIFIC GRAVITY, URINE: 1.01
POC UROBILINOGEN, URINE: 0.2 EU/DL

## 2025-06-13 RX ORDER — OLMESARTAN MEDOXOMIL 20 MG/1
20 TABLET ORAL DAILY
Qty: 90 TABLET | Refills: 3 | Status: SHIPPED | OUTPATIENT
Start: 2025-06-13

## 2025-06-13 RX ORDER — METOPROLOL SUCCINATE 100 MG/1
100 TABLET, EXTENDED RELEASE ORAL DAILY
Qty: 90 TABLET | Refills: 3 | Status: SHIPPED | OUTPATIENT
Start: 2025-06-13

## 2025-06-13 RX ORDER — ROSUVASTATIN CALCIUM 20 MG/1
20 TABLET, COATED ORAL DAILY
Qty: 90 TABLET | Refills: 3 | Status: SHIPPED | OUTPATIENT
Start: 2025-06-13

## 2025-06-13 ASSESSMENT — PATIENT HEALTH QUESTIONNAIRE - PHQ9
2. FEELING DOWN, DEPRESSED OR HOPELESS: NOT AT ALL
1. LITTLE INTEREST OR PLEASURE IN DOING THINGS: NOT AT ALL
SUM OF ALL RESPONSES TO PHQ9 QUESTIONS 1 AND 2: 0

## 2025-06-14 LAB
ALBUMIN SERPL-MCNC: 3.9 G/DL (ref 3.6–5.1)
ALP SERPL-CCNC: 76 U/L (ref 31–125)
ALT SERPL-CCNC: 12 U/L (ref 6–29)
ANION GAP SERPL CALCULATED.4IONS-SCNC: 11 MMOL/L (CALC) (ref 7–17)
AST SERPL-CCNC: 11 U/L (ref 10–35)
BASOPHILS # BLD AUTO: 38 CELLS/UL (ref 0–200)
BASOPHILS NFR BLD AUTO: 0.3 %
BILIRUB SERPL-MCNC: 0.3 MG/DL (ref 0.2–1.2)
BUN SERPL-MCNC: 10 MG/DL (ref 7–25)
CALCIUM SERPL-MCNC: 9.2 MG/DL (ref 8.6–10.2)
CHLORIDE SERPL-SCNC: 102 MMOL/L (ref 98–110)
CHOLEST SERPL-MCNC: 187 MG/DL
CHOLEST/HDLC SERPL: 1.9 (CALC)
CO2 SERPL-SCNC: 24 MMOL/L (ref 20–32)
CREAT SERPL-MCNC: 0.66 MG/DL (ref 0.5–0.99)
EGFRCR SERPLBLD CKD-EPI 2021: 109 ML/MIN/1.73M2
EOSINOPHIL # BLD AUTO: 175 CELLS/UL (ref 15–500)
EOSINOPHIL NFR BLD AUTO: 1.4 %
ERYTHROCYTE [DISTWIDTH] IN BLOOD BY AUTOMATED COUNT: 12.5 % (ref 11–15)
EST. AVERAGE GLUCOSE BLD GHB EST-MCNC: 140 MG/DL
EST. AVERAGE GLUCOSE BLD GHB EST-SCNC: 7.7 MMOL/L
GLUCOSE SERPL-MCNC: 104 MG/DL (ref 65–99)
HBA1C MFR BLD: 6.5 %
HCT VFR BLD AUTO: 44.2 % (ref 35–45)
HDLC SERPL-MCNC: 97 MG/DL
HGB BLD-MCNC: 14.3 G/DL (ref 11.7–15.5)
LDLC SERPL CALC-MCNC: 70 MG/DL (CALC)
LYMPHOCYTES # BLD AUTO: 2338 CELLS/UL (ref 850–3900)
LYMPHOCYTES NFR BLD AUTO: 18.7 %
MCH RBC QN AUTO: 29.5 PG (ref 27–33)
MCHC RBC AUTO-ENTMCNC: 32.4 G/DL (ref 32–36)
MCV RBC AUTO: 91.1 FL (ref 80–100)
MONOCYTES # BLD AUTO: 538 CELLS/UL (ref 200–950)
MONOCYTES NFR BLD AUTO: 4.3 %
NEUTROPHILS # BLD AUTO: 9413 CELLS/UL (ref 1500–7800)
NEUTROPHILS NFR BLD AUTO: 75.3 %
NONHDLC SERPL-MCNC: 90 MG/DL (CALC)
PLATELET # BLD AUTO: 409 THOUSAND/UL (ref 140–400)
PMV BLD REES-ECKER: 11 FL (ref 7.5–12.5)
POTASSIUM SERPL-SCNC: 4.6 MMOL/L (ref 3.5–5.3)
PROT SERPL-MCNC: 6.9 G/DL (ref 6.1–8.1)
RBC # BLD AUTO: 4.85 MILLION/UL (ref 3.8–5.1)
SODIUM SERPL-SCNC: 137 MMOL/L (ref 135–146)
TRIGL SERPL-MCNC: 112 MG/DL
TSH SERPL-ACNC: 1.02 MIU/L
WBC # BLD AUTO: 12.5 THOUSAND/UL (ref 3.8–10.8)

## 2025-06-15 NOTE — RESULT ENCOUNTER NOTE
Complete blood cell count shows slightly elevated white blood cell count as well as slightly elevated neutrophil count  Platelets also slightly elevated  Did the patient feel well?  This is usually pointing towards an acute infection/illness or even elevated allergies  Please let me know and how she feels    Hemoglobin A1c unfortunately did increase now at 6.5% putting the patient in the diabetic category  She can easily discuss this with her endocrinologist in the near future or even we can discuss about either lifestyle modifications or even medication if needed    Sugar slightly elevated 104 but otherwise liver, kidneys, electrolytes within normal limits    Thyroid within normal limits    Cholesterol is great with an LDL at 70

## 2025-07-22 ENCOUNTER — APPOINTMENT (OUTPATIENT)
Dept: OTOLARYNGOLOGY | Facility: CLINIC | Age: 45
End: 2025-07-22
Payer: COMMERCIAL

## 2025-12-12 ENCOUNTER — APPOINTMENT (OUTPATIENT)
Facility: CLINIC | Age: 45
End: 2025-12-12
Payer: COMMERCIAL